# Patient Record
Sex: MALE | Race: WHITE | NOT HISPANIC OR LATINO
[De-identification: names, ages, dates, MRNs, and addresses within clinical notes are randomized per-mention and may not be internally consistent; named-entity substitution may affect disease eponyms.]

---

## 2021-08-24 ENCOUNTER — TRANSCRIPTION ENCOUNTER (OUTPATIENT)
Age: 26
End: 2021-08-24

## 2022-05-26 PROBLEM — Z00.00 ENCOUNTER FOR PREVENTIVE HEALTH EXAMINATION: Status: ACTIVE | Noted: 2022-05-26

## 2022-07-20 ENCOUNTER — APPOINTMENT (OUTPATIENT)
Dept: ORTHOPEDIC SURGERY | Facility: CLINIC | Age: 27
End: 2022-07-20

## 2022-07-20 VITALS — HEIGHT: 72 IN | WEIGHT: 255 LBS | BODY MASS INDEX: 34.54 KG/M2

## 2022-07-20 DIAGNOSIS — M22.2X1 PATELLOFEMORAL DISORDERS, RIGHT KNEE: ICD-10-CM

## 2022-07-20 DIAGNOSIS — S83.91XA SPRAIN OF UNSPECIFIED SITE OF RIGHT KNEE, INITIAL ENCOUNTER: ICD-10-CM

## 2022-07-20 DIAGNOSIS — Z78.9 OTHER SPECIFIED HEALTH STATUS: ICD-10-CM

## 2022-07-20 DIAGNOSIS — J45.909 UNSPECIFIED ASTHMA, UNCOMPLICATED: ICD-10-CM

## 2022-07-20 PROCEDURE — 73562 X-RAY EXAM OF KNEE 3: CPT | Mod: RT

## 2022-07-20 PROCEDURE — 99213 OFFICE O/P EST LOW 20 MIN: CPT

## 2022-07-20 RX ORDER — IBUPROFEN 600 MG/1
600 TABLET, FILM COATED ORAL TWICE DAILY
Qty: 60 | Refills: 2 | Status: ACTIVE | COMMUNITY
Start: 2022-07-20 | End: 1900-01-01

## 2022-07-20 NOTE — DISCUSSION/SUMMARY
[de-identified] : Instructions:  Progress Note completed by Karime Lomeli PA-C\par * Dr. Archibald -- The documentation recorded accurately reflects the decisions made by me during this visit.

## 2022-07-20 NOTE — ASSESSMENT
[FreeTextEntry1] : recurrent right knee pain since early july 2022  which he noticed after playing ultimate frisbee.   history of pf effusion on mri 2021.   ttp anterior and mfc, but also some medial joint line.  possible mmt. had this before and came back again.

## 2022-07-20 NOTE — HISTORY OF PRESENT ILLNESS
[Gradual] : gradual [7] : 7 [2] : 2 [Dull/Aching] : dull/aching [Localized] : localized [Sharp] : sharp [Constant] : constant [Household chores] : household chores [Ice] : ice [de-identified] : 7/20/22:  recurrent right knee pain since early july 2022 which he noticed after playing ultimate frisbee.  pain continues to persist and worse with walking, running, twisting positions, squatting.  reports to clicking and popping.  no buckling.  rest, ice, tylenol prn with little relief.  [] : no [FreeTextEntry1] : right knee  [FreeTextEntry5] : Patient states no specific injury, just on going pain for about 2 weeks  [FreeTextEntry6] : tenderness  [FreeTextEntry9] : knee brace  [de-identified] : activity

## 2022-07-20 NOTE — PHYSICAL EXAM
[NL (0)] : extension 0 degrees [5___] : hamstring 5[unfilled]/5 [Equivocal] : equivocal Aftab [4___] : quadriceps 4[unfilled]/5 [Right] : right knee [There are no fractures, subluxations or dislocations. No significant abnormalities are seen] : There are no fractures, subluxations or dislocations. No significant abnormalities are seen [] : non-antalgic [FreeTextEntry8] : ttp mfc [de-identified] : very tight hamstrings [TWNoteComboBox7] : flexion 130 degrees

## 2022-07-21 ENCOUNTER — FORM ENCOUNTER (OUTPATIENT)
Age: 27
End: 2022-07-21

## 2022-07-22 ENCOUNTER — APPOINTMENT (OUTPATIENT)
Dept: MRI IMAGING | Facility: CLINIC | Age: 27
End: 2022-07-22

## 2022-07-22 PROCEDURE — 73721 MRI JNT OF LWR EXTRE W/O DYE: CPT | Mod: RT

## 2022-07-27 ENCOUNTER — APPOINTMENT (OUTPATIENT)
Dept: ORTHOPEDIC SURGERY | Facility: CLINIC | Age: 27
End: 2022-07-27

## 2022-07-27 VITALS — BODY MASS INDEX: 34.54 KG/M2 | WEIGHT: 255 LBS | HEIGHT: 72 IN

## 2022-07-27 DIAGNOSIS — M23.91 UNSPECIFIED INTERNAL DERANGEMENT OF RIGHT KNEE: ICD-10-CM

## 2022-07-27 PROCEDURE — 99214 OFFICE O/P EST MOD 30 MIN: CPT

## 2022-07-27 PROCEDURE — 99212 OFFICE O/P EST SF 10 MIN: CPT

## 2022-07-27 NOTE — ASSESSMENT
[FreeTextEntry1] : recurrent right knee pain since early july 2022  which he noticed after playing ultimate fris[employer]arae.   history of pf effusion on mri 2021.  mri 2022 shows pf inflammation

## 2022-07-27 NOTE — HISTORY OF PRESENT ILLNESS
[de-identified] : 7/20/22:  recurrent right knee pain since early july 2022 which he noticed after playing ultimate frisbee.  pain continues to persist and worse with walking, running, twisting positions, squatting.  reports to clicking and popping.  no buckling.  rest, ice, tylenol prn with little relief. \par 7/27/22: mild improvement with PT [Gradual] : gradual [7] : 7 [2] : 2 [Dull/Aching] : dull/aching [Localized] : localized [Sharp] : sharp [Constant] : constant [Household chores] : household chores [Ice] : ice [] : no [FreeTextEntry1] : right knee  [FreeTextEntry5] : Patient states no specific injury, just on going pain for about 2 weeks  [FreeTextEntry6] : tenderness  [FreeTextEntry9] : knee brace  [de-identified] : activity  [de-identified] : MRI

## 2022-07-27 NOTE — PHYSICAL EXAM
[NL (0)] : extension 0 degrees [4___] : quadriceps 4[unfilled]/5 [5___] : hamstring 5[unfilled]/5 [Equivocal] : equivocal Aftab [] : patient ambulates without assistive device [Right] : right knee [There are no fractures, subluxations or dislocations. No significant abnormalities are seen] : There are no fractures, subluxations or dislocations. No significant abnormalities are seen [de-identified] : very tight hamstrings [TWNoteComboBox7] : flexion 130 degrees

## 2022-10-16 ENCOUNTER — FORM ENCOUNTER (OUTPATIENT)
Age: 27
End: 2022-10-16

## 2022-12-13 ENCOUNTER — FORM ENCOUNTER (OUTPATIENT)
Age: 27
End: 2022-12-13

## 2023-06-30 ENCOUNTER — APPOINTMENT (OUTPATIENT)
Dept: ORTHOPEDIC SURGERY | Facility: CLINIC | Age: 28
End: 2023-06-30
Payer: COMMERCIAL

## 2023-06-30 VITALS — BODY MASS INDEX: 34.54 KG/M2 | WEIGHT: 255 LBS | HEIGHT: 72 IN

## 2023-06-30 DIAGNOSIS — Z78.9 OTHER SPECIFIED HEALTH STATUS: ICD-10-CM

## 2023-06-30 PROCEDURE — 99213 OFFICE O/P EST LOW 20 MIN: CPT

## 2023-07-02 NOTE — ASSESSMENT
[FreeTextEntry1] : Bilateral FCU tendonitis - reviewed radiographs and pathoanatomy with patient. Discussed management to consist of NSAID prn, bracing and OT along with activity modification. In light of chronic pain despite management by multiple physicians, PT and NSAIDs, will obtain bilateral wrist MRI without contrast and will followup thereafter to discuss results and develop plan of care.\par \par F/u after MRI

## 2023-07-02 NOTE — HISTORY OF PRESENT ILLNESS
[de-identified] : Age: 27M\par Hand Dominance: RHD \par Chief Complaint: bilateral wrist pain for 6+ weeks after moving heavy boxes. Felt a sharp pain on the lateral aspect on right hand. Reports lifting a shovel with his left hand 3 weeks ago, and felt the same sharp pain. Reports some numbness/tingling at the base of the palm and into the wrist. Patient has had care by multiple physicians with continued pain despite nonoperative interventions.\par Trauma: yes\par Outside Imaging/Treatment: Left wrist X-ray from [facility] (06/26/23), and right wrist X-ray from [facility] (06/10/23), both benign \par OTC Medications: prescribed Naproxen from [facility] for a few weeks with no relief\par OT/PT: some HEP (Stretching wrists) \par Bracing: wrist braces \par Pain worse with: movement\par Pain better with: ice \par

## 2023-07-02 NOTE — IMAGING
[de-identified] : RIGHT HAND EXAM\par +ttp at fovea and ECU.\par Skin intact\par No deformity, edema, ecchymosis\par Warm and well perfused\par Brisk capillary refill throughout\par Motor function intact AIN, PIN, and ulnar nerves\par Sensation intact to light touch in median, ulnar, and radial nerves\par Strength with , finger abduction, wrist flexion, wrist extension 5/5\par Finger and wrist motion is intact and full\par Patient is able to make a composite fist\par \par Right wrist radiographs with no fracture nor dislocation. Carpus aligned.\par \par LEFT HAND EXAM\par +ttp at fovea and ECU.\par Skin intact\par No deformity, edema, ecchymosis\par Warm and well perfused\par Brisk capillary refill throughout\par Motor function intact AIN, PIN, and ulnar nerves\par Sensation intact to light touch in median, ulnar, and radial nerves\par Strength with , finger abduction, wrist flexion, wrist extension 5/5\par Finger and wrist motion is intact and full\par Patient is able to make a composite fist\par \par Left wrist radiographs with no fracture nor dislocation. Carpus aligned.

## 2023-07-06 ENCOUNTER — APPOINTMENT (OUTPATIENT)
Dept: MRI IMAGING | Facility: CLINIC | Age: 28
End: 2023-07-06

## 2023-07-07 ENCOUNTER — FORM ENCOUNTER (OUTPATIENT)
Age: 28
End: 2023-07-07

## 2023-07-09 ENCOUNTER — FORM ENCOUNTER (OUTPATIENT)
Age: 28
End: 2023-07-09

## 2023-07-11 ENCOUNTER — APPOINTMENT (OUTPATIENT)
Dept: MRI IMAGING | Facility: CLINIC | Age: 28
End: 2023-07-11

## 2023-07-12 ENCOUNTER — APPOINTMENT (OUTPATIENT)
Dept: ORTHOPEDIC SURGERY | Facility: CLINIC | Age: 28
End: 2023-07-12
Payer: COMMERCIAL

## 2023-07-12 VITALS — WEIGHT: 255 LBS | BODY MASS INDEX: 34.54 KG/M2 | HEIGHT: 72 IN

## 2023-07-12 DIAGNOSIS — M25.539 PAIN IN UNSPECIFIED WRIST: ICD-10-CM

## 2023-07-12 PROCEDURE — 99213 OFFICE O/P EST LOW 20 MIN: CPT

## 2023-07-16 PROBLEM — M25.539 WRIST PAIN: Status: ACTIVE | Noted: 2023-06-30

## 2023-07-16 NOTE — HISTORY OF PRESENT ILLNESS
[de-identified] : Age: 27M\par Hand Dominance: RHD \par Chief Complaint: bilateral wrist pain for 6+ weeks after moving heavy boxes. Felt a sharp pain on the lateral aspect on right hand. Reports lifting a shovel with his left hand 3 weeks ago, and felt the same sharp pain. Reports some numbness/tingling at the base of the palm and into the wrist. Patient has had care by multiple physicians with continued pain despite nonoperative interventions.\par Trauma: yes\par Outside Imaging/Treatment: Left wrist X-ray from [facility] (06/26/23), and right wrist X-ray from [facility] (06/10/23), both benign \par OTC Medications: prescribed Naproxen from [facility] for a few weeks with no relief\par OT/PT: some HEP (Stretching wrists) \par Bracing: wrist braces \par Pain worse with: movement\par Pain better with: ice \par  \par 07/12/23: f/u bilateral wrists. Patient reports some pain in the base of the ring finger and small finger. Patient has been doing OT for the past 2 weeks. Patient here for MRI results.

## 2023-07-16 NOTE — IMAGING
[de-identified] : RIGHT HAND EXAM\par +ttp at fovea and ECU.\par Skin intact\par No deformity, edema, ecchymosis\par Warm and well perfused\par Brisk capillary refill throughout\par Motor function intact AIN, PIN, and ulnar nerves\par Sensation intact to light touch in median, ulnar, and radial nerves\par Strength with , finger abduction, wrist flexion, wrist extension 5/5\par Finger and wrist motion is intact and full\par Patient is able to make a composite fist\par \par Right wrist radiographs with no fracture nor dislocation. Carpus aligned.\par Right wrist MRI with volar ganglion, broad based. No fracture nor dislocation.\par \par LEFT HAND EXAM\par +ttp at fovea and ECU.\par Skin intact\par No deformity, edema, ecchymosis\par Warm and well perfused\par Brisk capillary refill throughout\par Motor function intact AIN, PIN, and ulnar nerves\par Sensation intact to light touch in median, ulnar, and radial nerves\par Strength with , finger abduction, wrist flexion, wrist extension 5/5\par Finger and wrist motion is intact and full\par Patient is able to make a composite fist\par \par Left wrist radiographs with no fracture nor dislocation. Carpus aligned.\par Left wrist MRI with no fracture nor dislocation. SL tear with no diastasis.

## 2023-07-16 NOTE — ASSESSMENT
[FreeTextEntry1] : Bilateral FCU tendonitis - reviewed MRI,  radiographs and pathoanatomy with patient. Discussed management to consist of NSAID prn, bracing and OT along with activity modification. Wrist widget, ease into use.\par \par F/u prn